# Patient Record
Sex: FEMALE | Race: WHITE | NOT HISPANIC OR LATINO | Employment: FULL TIME | URBAN - METROPOLITAN AREA
[De-identification: names, ages, dates, MRNs, and addresses within clinical notes are randomized per-mention and may not be internally consistent; named-entity substitution may affect disease eponyms.]

---

## 2017-09-05 ENCOUNTER — OFFICE VISIT (OUTPATIENT)
Dept: FAMILY MEDICINE | Facility: CLINIC | Age: 27
End: 2017-09-05
Payer: COMMERCIAL

## 2017-09-05 ENCOUNTER — LAB VISIT (OUTPATIENT)
Dept: LAB | Facility: HOSPITAL | Age: 27
End: 2017-09-05
Attending: INTERNAL MEDICINE
Payer: COMMERCIAL

## 2017-09-05 VITALS
TEMPERATURE: 98 F | HEIGHT: 65 IN | DIASTOLIC BLOOD PRESSURE: 68 MMHG | SYSTOLIC BLOOD PRESSURE: 110 MMHG | BODY MASS INDEX: 21.79 KG/M2 | WEIGHT: 130.75 LBS

## 2017-09-05 DIAGNOSIS — E73.9 DIETARY LACTOSE INTOLERANCE: ICD-10-CM

## 2017-09-05 DIAGNOSIS — F32.A DEPRESSION, UNSPECIFIED DEPRESSION TYPE: ICD-10-CM

## 2017-09-05 DIAGNOSIS — Z23 NEED FOR TDAP VACCINATION: ICD-10-CM

## 2017-09-05 DIAGNOSIS — M77.8 TENDONITIS OF FINGER: ICD-10-CM

## 2017-09-05 DIAGNOSIS — Z11.3 SCREENING FOR STD (SEXUALLY TRANSMITTED DISEASE): ICD-10-CM

## 2017-09-05 DIAGNOSIS — Z87.81 HISTORY OF TOE FRACTURE: ICD-10-CM

## 2017-09-05 DIAGNOSIS — L70.0 ACNE VULGARIS: ICD-10-CM

## 2017-09-05 DIAGNOSIS — F41.9 ANXIETY: ICD-10-CM

## 2017-09-05 DIAGNOSIS — Z00.00 ANNUAL PHYSICAL EXAM: Primary | ICD-10-CM

## 2017-09-05 PROCEDURE — 87591 N.GONORRHOEAE DNA AMP PROB: CPT

## 2017-09-05 PROCEDURE — 90715 TDAP VACCINE 7 YRS/> IM: CPT | Mod: S$GLB,,, | Performed by: INTERNAL MEDICINE

## 2017-09-05 PROCEDURE — 99999 PR PBB SHADOW E&M-NEW PATIENT-LVL III: CPT | Mod: PBBFAC,,, | Performed by: INTERNAL MEDICINE

## 2017-09-05 PROCEDURE — 86592 SYPHILIS TEST NON-TREP QUAL: CPT

## 2017-09-05 PROCEDURE — 86703 HIV-1/HIV-2 1 RESULT ANTBDY: CPT

## 2017-09-05 PROCEDURE — 90471 IMMUNIZATION ADMIN: CPT | Mod: S$GLB,,, | Performed by: INTERNAL MEDICINE

## 2017-09-05 PROCEDURE — 36415 COLL VENOUS BLD VENIPUNCTURE: CPT | Mod: PO

## 2017-09-05 PROCEDURE — 99385 PREV VISIT NEW AGE 18-39: CPT | Mod: 25,S$GLB,, | Performed by: INTERNAL MEDICINE

## 2017-09-05 RX ORDER — LORAZEPAM 0.5 MG/1
0.5 TABLET ORAL EVERY 6 HOURS PRN
COMMUNITY

## 2017-09-05 RX ORDER — TRETINOIN 1 MG/G
CREAM TOPICAL NIGHTLY
COMMUNITY
End: 2020-05-21

## 2017-09-05 RX ORDER — TRAZODONE HYDROCHLORIDE 100 MG/1
100 TABLET ORAL NIGHTLY PRN
COMMUNITY
End: 2020-05-21

## 2017-09-05 RX ORDER — DESVENLAFAXINE SUCCINATE 50 MG/1
100 TABLET, EXTENDED RELEASE ORAL DAILY
COMMUNITY
End: 2020-05-21

## 2017-09-05 RX ORDER — SPIRONOLACTONE 50 MG/1
50 TABLET, FILM COATED ORAL DAILY
COMMUNITY
End: 2020-05-21

## 2017-09-05 NOTE — PROGRESS NOTES
Subjective:        Patient ID: Chelsi Montejo is a 26 y.o. female.    Chief Complaint: Annual Exam ([facility])    HPI   Chelsi Montejo presents for annual exam.    1. ?lactose intolerance: Pt reports for the past few months she has had severe diarrhea, abdominal cramping after eating dairy products.  She notices it most after eating pizza.  She has tried taking Lactaid pills before she eats a lot of cheese or other dairy and thinks it helps.    2. R hand pain: Pt c/o pain or discomfort in the tendon of the right ring finger.  It comes and goes, feels like it's connected to and radiates into the elbow.  Nothing in particular makes it worse but she notices it most when she is typing or throwing the ball for her dog.  Ice helps the discomfort.  This does not limit her usual activity or work.  Pt is R handed and types a lot for work.    3. Hx of R great toe fracture: Pt broke her R great toe >1 year ago, never completed PT due to traveling frequently for work.  The pain continues to gradually improve but she reports her gait is not quite normal yet and she has developed a bunion as a result of the fx.    Review of Systems   Constitutional: Positive for fatigue. Negative for unexpected weight change.   HENT: Negative for ear pain, hearing loss, sore throat and trouble swallowing.    Eyes: Negative for visual disturbance.   Respiratory: Negative for chest tightness and shortness of breath.    Cardiovascular: Negative for chest pain and leg swelling.   Gastrointestinal: Negative for abdominal pain, blood in stool, constipation, diarrhea, nausea and vomiting.   Genitourinary: Negative for difficulty urinating, hematuria, menstrual problem and vaginal discharge.   Musculoskeletal: Positive for back pain.   Skin: Negative for rash.   Neurological: Negative for dizziness, light-headedness and headaches.   Psychiatric/Behavioral: Positive for dysphoric mood and sleep disturbance. The patient is nervous/anxious.             Objective:        Vitals:    09/05/17 1408   BP: 110/68   Temp: 97.8 °F (36.6 °C)     Physical Exam   Constitutional: She is oriented to person, place, and time. She appears well-developed and well-nourished. No distress.   HENT:   Head: Normocephalic and atraumatic.   Right Ear: External ear normal.   Left Ear: External ear normal.   Nose: Nose normal.   Mouth/Throat: Oropharynx is clear and moist. No oropharyngeal exudate.   - bilateral ear canals clear, tympanic membranes visualized - normal color and light reflex   Eyes: Conjunctivae and EOM are normal.   Neck: Normal range of motion. Neck supple. No thyromegaly present.   Cardiovascular: Normal rate, regular rhythm and normal heart sounds.    No murmur heard.  Pulmonary/Chest: Effort normal and breath sounds normal. No respiratory distress.   Abdominal: Soft. She exhibits no distension and no mass. There is no tenderness. There is no guarding.   Musculoskeletal: Normal range of motion. She exhibits tenderness (mild along 4th right finger tendon of extensor digitorum). She exhibits no edema or deformity.   Lymphadenopathy:     She has no cervical adenopathy.   Neurological: She is alert and oriented to person, place, and time.   Skin: Skin is warm and dry.   Vitals reviewed.          Assessment:         1. Annual physical exam    2. Screening for STD (sexually transmitted disease)    3. Need for Tdap vaccination    4. Tendonitis of finger    5. History of toe fracture    6. Dietary lactose intolerance    7. Anxiety    8. Acne vulgaris              Plan:         Chelsi was seen today for annual exam.    Diagnoses and all orders for this visit:    Annual physical exam  - STI screening, TDaP today    Screening for STD (sexually transmitted disease)  -     C. trachomatis/N. gonorrhoeae by AMP DNA Urine  -     HIV-1 and HIV-2 antibodies; Future  -     RPR; Future    Need for Tdap vaccination  -     Tdap Vaccine    Tendonitis of finger: Limit activities that  exacerbate pain, try switching to left hand for some activities, ice and NSAIDs for pain and inflammation, referral to PT  -     Ambulatory Referral to Physical/Occupational Therapy    History of toe fracture: Refer to PT for gait training  -     Ambulatory Referral to Physical/Occupational Therapy    Dietary lactose intolerance: Recommend discontinuing all dairy for 2 weeks the add back one food item at a time to see if pt can tolerate individual dairy products.  Okay to take Lactase enzymes before eating dairy.  Follow up if sx persist despite elimination of dairy products.    Anxiety: Prystiq 50mg qd, Ativan PRN, Trazodone PRN insomnia; follow up with psychiatrist Dr. Carmela Narayanan.    Acne vulgaris: Retin A, Spironolactone 25mg qd; follow up derm.  Pt reports lab work done with derm when Spironolactone started.          Return in 1  year for annual exam; sooner PRN.

## 2017-09-06 ENCOUNTER — TELEPHONE (OUTPATIENT)
Dept: FAMILY MEDICINE | Facility: CLINIC | Age: 27
End: 2017-09-06

## 2017-09-06 DIAGNOSIS — M77.8 TENDONITIS OF FINGER: Primary | ICD-10-CM

## 2017-09-06 LAB
C TRACH DNA SPEC QL NAA+PROBE: NOT DETECTED
HIV 1+2 AB+HIV1 P24 AG SERPL QL IA: NEGATIVE
N GONORRHOEA DNA SPEC QL NAA+PROBE: NOT DETECTED
RPR SER QL: NORMAL

## 2017-09-06 NOTE — TELEPHONE ENCOUNTER
Notified patient of negative results per . Patient states that PT needs a separate order for O.T. for the tendonitis.

## 2017-09-07 ENCOUNTER — TELEPHONE (OUTPATIENT)
Dept: FAMILY MEDICINE | Facility: CLINIC | Age: 27
End: 2017-09-07

## 2017-09-07 NOTE — TELEPHONE ENCOUNTER
Notified patient that O.T. Was ordered and if they need anything else to Please call or send message. Patient verbalizes understanding.

## 2017-09-11 ENCOUNTER — CLINICAL SUPPORT (OUTPATIENT)
Dept: REHABILITATION | Facility: HOSPITAL | Age: 27
End: 2017-09-11
Attending: INTERNAL MEDICINE
Payer: COMMERCIAL

## 2017-09-11 DIAGNOSIS — R27.8 COORDINATION IMPAIRMENT: ICD-10-CM

## 2017-09-11 DIAGNOSIS — M25.674 DECREASED RANGE OF MOTION OF RIGHT FOOT: ICD-10-CM

## 2017-09-11 DIAGNOSIS — R53.1 WEAKNESS: ICD-10-CM

## 2017-09-11 DIAGNOSIS — R26.89 IMPAIRMENT OF BALANCE: ICD-10-CM

## 2017-09-11 PROCEDURE — 97110 THERAPEUTIC EXERCISES: CPT | Mod: PO

## 2017-09-11 PROCEDURE — 97161 PT EVAL LOW COMPLEX 20 MIN: CPT | Mod: PO

## 2017-09-11 NOTE — PROGRESS NOTES
OUTPATIENT PHYSICAL THERAPY  PHYSICAL THERAPY EVALUATION    Name: Chelsi Montejo  Clinic Number: [id number]    Evaluation Date: 09/11/2017  Visit #: 1 / 10   Authorization period Expiration: 12/31/2017   Plan of Care Expiration: 12/31/2017  Precautions: N/A    Diagnosis:   Encounter Diagnoses   Name Primary?    Weakness     Impairment of balance     Decreased range of motion of right foot     Coordination impairment      Physician: Ida Rowe MD  Treatment Orders: PT Eval and Treat  Past Medical History:   Diagnosis Date    Acne vulgaris     derm    Anxiety 09/05/2017    psychiatry; Dr. Carmela Narayanan    Depression 09/05/2017    psychiatry; Dr. Carmela Narayanan    History of toe fracture 9/5/2017    Right great toe    Insomnia     psychiatry; Dr. Carmela Narayanan     Current Outpatient Prescriptions   Medication Sig    desvenlafaxine succinate (PRISTIQ) 50 MG Tb24 Take 100 mg by mouth once daily.    lorazepam (ATIVAN) 0.5 MG tablet Take 0.5 mg by mouth every 6 (six) hours as needed for Anxiety.    spironolactone (ALDACTONE) 50 MG tablet Take 50 mg by mouth once daily.    trazodone (DESYREL) 100 MG tablet Take 100 mg by mouth nightly as needed for Insomnia.    tretinoin (RETIN-A) 0.1 % cream Apply topically every evening.     No current facility-administered medications for this visit.      Review of patient's allergies indicates:  No Known Allergies    History   Prior Therapy: Physical therapy   Social History: Pt currently lives in apartment without stairs, drives, and works full time.   Previous functional status: Prior to injury pt was able to exercise regularly and experienced no limitations in mobility.   Current functional status: Pt is currently able to perform daily ADLs independently with mild reproduction of symptoms. However, pt is unable to participate in physical activity such as hiking and prolonged walking due to injury.   Work: Investigator, pt has a standing desk at work and is not  "currently limited in occupation duties.     Subjective   History of Present Illness: Patient arrived to [facility] with complaints of R foot pain originating in March 2016. Pt reported falling while walking two dogs pulling her in opposite directions. She injured her toe and began to self treat with ice and rest for 4 months before receiving medical treatment. Per pt report, she fractured her big toe and wore a boot for 3 months following the identification of fracture. Pt reported no prior history of injury to foot or ankle. However, patient stated she had part of her great right toe removed "years ago" by a podiatrist. Patient complained of pain worsening throughout the day and with excessive walking in poor shoes. Pt participated in previous trial of physical therapy after initial injury, but stated she was unable to complete therapy because of her work schedule. Additionally, pt reported previous history of hip pain that began ~ 8 years ago and is treated with exercises prescribed by her orthopedic physician.   DOI: March 2016   Imaging, none  Pain: current 2/10, worst 5/10, best 1/10, Aching and Dull, intermittent  Radicular symptoms: no  Aggravating factors: wearing bad shoes, flares up frequently, hiking, driving   Easing factors: massage, used ice, toe raises   Pts goals: Chelsi wishes to return to PLOF and relieve symptoms experienced during activity.     Objective   Mental status: alert, interactive, oriented x3  Posture/ Alignment: Fair   Chelsi demonstrated forward head, scapular protraction, and increased pronation to B feet. Pt also presented with medial bunion on R MTP that, per pt, began to develop after injury.    GAIT DEVIATIONS: Chelsi amb with decreased pelvic and trunk rotation and excessive ER of B LE.  During examination pt reported feeling "more stable" walking barefoot than in shoes.     ROM:     PROM Right Left Comment   DF: 5 degrees 12    PF: 55 degrees 50 degrees    Eversion: 25 " degrees 65 degrees    Inversion: 20 degrees 60 degrees    1st MTP Ext: 35 degrees 60 degrees    *pain      Strength:        Right Left Comment   DF: 4+/5 5/5    PF: 4+/5 5/5    Eversion: 5/5 5/5    Inversion: 5/5 5/5    1st MTP Ext: 4/5 5/5          Special Tests:    - Anterior Drawer: right negative    Neurovascular  Pt presented with intact sensation and normal reflexes to B Le.   Balance:   - Pt performed modified tandem stance with R LE posterior and moderate sway for ~ 20 seconds before LOB  - Pt performed SLS on R LE with moderate sway and symptoms for ~ 15 seconds before LOB    Palpation:  Pt tender to palpation over posterior tibialis insertion, R medial calf, and along 1st metatarsal. Pt experienced relief with stretch to R posterior tibialis. While assessing muscle tone and length of R gastrocnemius patient displayed involuntary plantarflexion of R foot. As the therapist lightly palpated the proximal, medial calf the patient's R foot would involuntarily twitch into plantarflexion, as if testing the achilles reflex. Pt reported no pain, but was unable to describe symptoms when twitching was reproduced.     Joint Play:  Increased, symmetrical mobility at B talocrural joints in AP and PA directions  Symmetrical 2:1 ratio in B subtalar joints   Decreased supination in R midfoot  Pt reported no symptoms during joint mobilizations.     Pt/family was provided educational information, including: role of PT, goals for PT, scheduling - pt verbalized understanding. Discussed insurance plan with pt.     TREATMENT   Time In: 3:07 PM  Time Out: 4:07    Discussed Plan of Care with patient: Yes    Chelsi received 10 minutes of therapeutic exercises including:    Fischers 2 x 10    Gastroc/Soleux stretching 3 x 30 seconds each       Written Home Exercises Provided: See Patient Instructions   Exercises were reviewed and Chelsi was able to demonstrate them prior to the end of the session. Chelsi demonstrated good  understanding  of the education provided.     Assessment   Chelsi is a 26 y.o. female referred to outpatient physical therapy with a medical diagnosis of history of toe fracture. Chelsi presents with limitations in active mobility and intrinsic neuromuscular control of R foot. She demonstrates impairments including limitations as described in the problem list. Pt prognosis is Good. Positive prognostic factors include age, current health, level of activity, willingness to participate in therapy. Negative prognostic factors include time elapsed since initial injury. Pt will benefit from skilled outpatient physical therapy to address the above stated deficits, provide pt education, and to maximize pt's level of independence.     History  Co-morbidities and personal factors that may impact the plan of care Examination  Body Structures and Functions, activity limitations and participation restrictions that may impact the plan of care    Clinical Presentation   Co-morbidities:   none        Personal Factors:   lifestyle Body Regions:   lower extremities    Body Systems:   ROM  strength  balance  gait  motor control        Participation Restrictions:   Pt unable to participate in physical activity, prolonged driving, or walking due to symptoms.      Activity limitations:   Learning and applying knowledge  no deficits    General Tasks and Commands  no deficits    Communication  no deficits    Mobility  walking  driving (bike, car, motorcycle)    Self care  no deficits    Domestic Life  no deficits    Interactions/Relationships  no deficits    Life Areas  no deficits    Community and Social Life  no deficits         stable and uncomplicated                      low   moderate  high Decision Making/ Complexity Score:  low     Pt's spiritual, cultural and educational needs considered and pt agreeable to plan of care and goals as stated below:     Anticipated Barriers for physical therapy: Patient's work schedule which requires frequent  travel    GOALS: Short Term Goals:  4 weeks  1. Report decreased R ankle pain  <  5  /10 at worst to increase tolerance for prolonged standing.  2. Pt will be able to perform 2 x 10 4 inch step ups without increase in symptoms to improve stair ambulation.   3. Increased MMT for R ankle dorsiflexion to 5/5 to improve gait mechanics and increase stability while ambulating.  4. Pt to report a 50% improvement in ability to walk long distances to improve functional mobility.      Long Term Goals: 8 weeks  1. Report decreased R ankle pain  < 3 /10 at worst to increase tolerance for prolonged standing.  2. Pt will be able to perform 2 x 10 4 inch step downs without increase in symptoms and good eccentric control to improve stair ambulation.   3. Increase SLS on R to 45 seconds to improve stability when walking on uneven surfaces.  4. Pt to report a 50% improvement in ability to walk long distances to improve functional mobility.  5. Pt to be Independent with HEP to improve ROM and independence with ADL's      Plan   Outpatient physical therapy 1- 2 times weekly to include: pt ed, HEP, therapeutic exercises, therapeutic activities, neuromuscular re-education/ balance exercises, manual therapy, and modalities prn. Cont PT for 3 weeks. Pt may be seen by PTA as part of the rehabilitation team.     I certify the need for these services furnished under this plan of treatment and while under my care.    Veronica Franklin, PT

## 2017-09-12 PROBLEM — R26.89 IMPAIRMENT OF BALANCE: Status: ACTIVE | Noted: 2017-09-12

## 2017-09-12 PROBLEM — R53.1 WEAKNESS: Status: ACTIVE | Noted: 2017-09-12

## 2017-09-12 PROBLEM — R27.8 COORDINATION IMPAIRMENT: Status: ACTIVE | Noted: 2017-09-12

## 2017-09-12 PROBLEM — M25.674 DECREASED RANGE OF MOTION OF RIGHT FOOT: Status: ACTIVE | Noted: 2017-09-12

## 2017-09-15 ENCOUNTER — CLINICAL SUPPORT (OUTPATIENT)
Dept: REHABILITATION | Facility: HOSPITAL | Age: 27
End: 2017-09-15
Attending: INTERNAL MEDICINE
Payer: COMMERCIAL

## 2017-09-15 DIAGNOSIS — R53.1 WEAKNESS: ICD-10-CM

## 2017-09-15 DIAGNOSIS — R27.8 COORDINATION IMPAIRMENT: ICD-10-CM

## 2017-09-15 DIAGNOSIS — R26.89 IMPAIRMENT OF BALANCE: ICD-10-CM

## 2017-09-15 DIAGNOSIS — M25.674 DECREASED RANGE OF MOTION OF RIGHT FOOT: ICD-10-CM

## 2017-09-15 PROCEDURE — 97110 THERAPEUTIC EXERCISES: CPT | Mod: PO

## 2017-09-15 PROCEDURE — 97140 MANUAL THERAPY 1/> REGIONS: CPT | Mod: PO

## 2017-09-15 NOTE — PROGRESS NOTES
Physical Therapy Daily Note   Name: Chelsi Montejo  Clinic Number: [id number]    Time in: 9:00 AM  Time Out: 9:50 AM  Total Treatment Time: 50  Minutes  Last PN: NA  Date of eval: 9/11/2017  Visit #: 2/10  Auth expiration: 12/31/2017  POC expiration: 12/31/2017    Diagnosis:   Encounter Diagnoses   Name Primary?    Weakness     Impairment of balance     Decreased range of motion of right foot     Coordination impairment      Physician: Ida Rowe MD  Treatment Orders: PT Eval and Treat    Subjective   Pt reports: Compliance with HEP and no symptom provocation with exercises. Pt reports noticeable increase in symptoms while driving since evaluation.     Pain Scale:  3/10      Objective     Chelsi received therapeutic exercises to develop strength, neuromuscular control and endurance for 35 minutes including:     Short foot x 1 min   Toe Curls x 10  Resisted PF/INV, DF, and PF with YTB x 15 each   Toe Spreads x 1 min   Manual Resistance Exercises:  - PF/INV to DF/EV  X 15  - PF/EV to DF/INV x 15  - R MTP extension to flexion x 15  Calf Stretch 3 x 30 seconds  Soleus Stretch 3 x 30 seconds     Chelsi received the following manual therapy techniques: Joint mobilizations and Soft tissue Mobilization were applied to the: R foot for 15 minutes.     R MTP PA and AP Grade I - IV mobilizations     R navicular on MTP Grade I - III mobilizations     Posterior Tib Stretch 3 x 30 seconds each     STM to deep instrinsic tissue between 1st and 2nd Metatarsals       Pt educated on TherEx instructions and purpose, role of foot intrinsics, posterior tibialis, windlass mechanism, and arch support. Pt demo good understanding of the education provided. Chelsi demonstrated good return demonstration of activities.       Written Home Exercises Provided: Patient educated to continue with previously issued HEP.  Exercises were reviewed and Chelsi was able to demonstrate them prior to  the end of the session.  Chelsi demonstrated good  understanding of the education provided.     Assessment   Chelsi demonstrated impaired neuromuscular control of posterior tibialis, FHL and deep intrinsics of R foot. Pt required tactile cueing for increased activation of instrinsic muscles during short foot exercise and decreased activation of toe extensors. Pt reported pain with toe spread exercise in R great toe. Pt demonstrated decreased activation of FHL during MRE involving R toe extension and required manual cueing to great toe. Chelsi denied increase in symptoms throughout therapy treatment and experienced no adverse effects to exercise.     Chelsi is progressing well towards her goals. Will benefit from con't skilled care.    Anticipated barriers to physical therapy: none    Pt's spiritual, cultural and educational needs considered and pt agreeable to plan of care and goals.    Plan   Continue with established Plan of Care towards PT goals. Introduce balance activities next treatment session.       Veronica Franklin, PT

## 2017-09-19 ENCOUNTER — CLINICAL SUPPORT (OUTPATIENT)
Dept: REHABILITATION | Facility: HOSPITAL | Age: 27
End: 2017-09-19
Attending: INTERNAL MEDICINE
Payer: COMMERCIAL

## 2017-09-19 DIAGNOSIS — R26.89 IMPAIRMENT OF BALANCE: ICD-10-CM

## 2017-09-19 DIAGNOSIS — R53.1 WEAKNESS: ICD-10-CM

## 2017-09-19 DIAGNOSIS — M25.674 DECREASED RANGE OF MOTION OF RIGHT FOOT: ICD-10-CM

## 2017-09-19 DIAGNOSIS — R27.8 COORDINATION IMPAIRMENT: ICD-10-CM

## 2017-09-19 PROCEDURE — 97110 THERAPEUTIC EXERCISES: CPT | Mod: PO

## 2017-09-19 PROCEDURE — 97140 MANUAL THERAPY 1/> REGIONS: CPT | Mod: PO

## 2017-09-19 NOTE — PROGRESS NOTES
Physical Therapy Daily Note   Name: Chelsi Montejo  Clinic Number: [id number]    Time in: 9:00 AM  Time Out: 10:00 AM  Total Treatment Time: 60 minutes  Last PN: NA  Date of eval: 2017  Visit #: 3/10  Auth expiration: 2017  POC expiration: 2017    Diagnosis:   Encounter Diagnoses   Name Primary?    Weakness     Impairment of balance     Decreased range of motion of right foot     Coordination impairment      Physician: Ida Rowe MD  Treatment Orders: PT Eval and Treat    Subjective   Pt reports: Feeling okay today, drove a lot yesterday with minimal symptoms during and after.      Pain Scale:  3/10      Objective     Chelsi received therapeutic exercises to develop strength, neuromuscular control and endurance for 35 minutes includin. Short foot 2 x 1 min   2. Toe Curls with full extension 2 x 1 min  3. Resisted PF/DF with YTB x 15 each   4. Toe Spreads x 1 min   5. Manual Resistance Exercises:   - PF/INV to DF/EV  X 15   - PF/EV to DF/INV x 15   - R MTP extension to flexion x 15  6. Calf Stretch 3 x 30 seconds  7. Soleus Stretch 3 x 30 seconds     Chelsi received the following manual therapy techniques: Joint mobilizations and Soft tissue Mobilization were applied to the: R foot for 15 minutes.     R MTP PA and AP Grade I - IV mobilizations     Posterior Tib Stretch 3 x 30 seconds each     FHL stretch 3 x 30 seconds     STM to deep instrinsic tissue between 1st and 2nd Metatarsals     Patient participated in neuromuscular re-education activities to improve: Balance for 10 minutes. The following activities were included:  1. R and L SLS on ground 2 x 1 min   2. SL Son foam 2 x 1 min   3. Controlled DF/PF on Fitter Board 2 x 1 min   4. Fitter Balnace x 1 min     Pt educated on new therapeutic exercises, proprioception and ankle stability. Pt demo good understanding of the education provided. Chelsi demonstrated good return  demonstration of activities.       Written Home Exercises Provided: Patient educated to continue with previously issued HEP.  Exercises were reviewed and Chelsi was able to demonstrate them prior to the end of the session.  Chelsi demonstrated good  understanding of the education provided.     Assessment   Chelsi participated in treatment session without any symptom provocation or adverse effects. Pt demonstrated difficulty completing balance activities on fitter board in A/P and M/L directions. Pt unable to maintain balance on board from > 15 seconds before UE contact for support. Chelsi continues to display poor neuromuscular control of EHL and great toe extension without visual, tactile and verbal cueing. Pt demonstrated improved control of intrinsic musculature during short foot and toe spreading exercises.     Chelsi is progressing well towards her goals. Will benefit from con't skilled care.    Anticipated barriers to physical therapy: none    Pt's spiritual, cultural and educational needs considered and pt agreeable to plan of care and goals.    Plan   Continue with established Plan of Care towards PT goals. Introduce balance activities next treatment session.       Veronica Franklin, PT

## 2017-09-25 ENCOUNTER — CLINICAL SUPPORT (OUTPATIENT)
Dept: REHABILITATION | Facility: HOSPITAL | Age: 27
End: 2017-09-25
Attending: INTERNAL MEDICINE
Payer: COMMERCIAL

## 2017-09-25 DIAGNOSIS — M25.674 DECREASED RANGE OF MOTION OF RIGHT FOOT: ICD-10-CM

## 2017-09-25 DIAGNOSIS — R27.8 COORDINATION IMPAIRMENT: ICD-10-CM

## 2017-09-25 DIAGNOSIS — R53.1 WEAKNESS: ICD-10-CM

## 2017-09-25 DIAGNOSIS — R26.89 IMPAIRMENT OF BALANCE: ICD-10-CM

## 2017-09-25 PROCEDURE — 97140 MANUAL THERAPY 1/> REGIONS: CPT | Mod: PO

## 2017-09-25 PROCEDURE — 97110 THERAPEUTIC EXERCISES: CPT | Mod: PO

## 2017-09-25 NOTE — PROGRESS NOTES
Physical Therapy Daily Note   Name: Chelsi Montejo  Clinic Number: [id number]    Time in: 9:01 AM  Time Out: 9:56 AM  Total Treatment Time: 55 minutes  Last PN: NA  Date of eval: 2017  Visit #: 4/10  Auth expiration: 2017  POC expiration: 2017    Diagnosis:   Encounter Diagnoses   Name Primary?    Weakness     Impairment of balance     Decreased range of motion of right foot     Coordination impairment      Physician: Ida Rowe MD  Treatment Orders: PT Eval and Treat    Subjective   Pt reports: She is feeling good today, reports usual soreness. Pt drove to [place] this weekend with no increase in symptoms and went hiking without pain. Pt stated she felt more mobility in her toe while hiking up and downhill.     Pain Scale:  2/10      Objective     Chelsi received therapeutic exercises to develop strength, neuromuscular control and endurance for 40 minutes includin. Short foot in Standing 2 x 1 min   2. Toe Curls with full extension 2 x 1 min  3. Resisted PF/DF with YTB x 15 each (NP,added to HEP )  4. Toe Spreads x 1 min   5. Manual Resistance Exercises (NP):   - PF/INV to DF/EV  X 15   - PF/EV to DF/INV x 15   - R MTP extension to flexion x 15  6. Calf Stretch 3 x 30 seconds  7. Soleus Stretch 3 x 30 seconds   8. Standing B Heel Raises with R eccentric lowering 2 x 10   9. Baps board CW/CCW x 1 min each   10. Heel/Toe Walking  3 laps x 5' each     Chelsi received the following manual therapy techniques: Joint mobilizations and Soft tissue Mobilization were applied to the: R foot for 15 minutes.     R MTP PA and AP Grade II - IV mobilizations     Posterior Tib Stretch 3 x 30 seconds each     FHL stretch 3 x 30 seconds     STM to deep instrinsic tissue between 1st and 2nd Metatarsals     STM to Achilles tendon     Patient participated in neuromuscular re-education activities to improve: Balance for 5 minutes. The following  activities were included:  1. Controlled DF/PF on Fitter Board 2 x 1 min with fingertip contact prn   2. Fitter Balnace AP and ML x 1 min each     Pt educated on new therapeutic exercises and new HEP. Pt demo good understanding of the education provided. Chelsi demonstrated good return demonstration of activities.     Written Home Exercises Provided: Yes, New exercises added (ankle 4 ways with YTB, Heel slides with heel raise, short foot in sitting)  Exercises were reviewed and Chelsi was able to demonstrate them prior to the end of the session.  Chelsi demonstrated good  understanding of the education provided.     Assessment   Chelsi participated in treatment session without any symptom provocation or adverse effects. Pt required manual cueing to maintain proximal MTP contact on ground during standing short foot exercise. Chelsi demonstrated increased intrinsic activation during toe curls and short foot in treatment session. Additionally, Chelsi displayed improved control and activation of EHL. Tactile and verbal cueing given during standing heel raises to avoid excessive supination of forefoot while up on toes.Chelsi is progressing well towards her goals. Will benefit from con't skilled care.    Anticipated barriers to physical therapy: none    Pt's spiritual, cultural and educational needs considered and pt agreeable to plan of care and goals.    Plan   Continue with established Plan of Care towards PT goals.       Veronica Franklin, PT

## 2017-09-29 ENCOUNTER — CLINICAL SUPPORT (OUTPATIENT)
Dept: REHABILITATION | Facility: HOSPITAL | Age: 27
End: 2017-09-29
Attending: INTERNAL MEDICINE
Payer: COMMERCIAL

## 2017-09-29 DIAGNOSIS — R26.89 IMPAIRMENT OF BALANCE: ICD-10-CM

## 2017-09-29 DIAGNOSIS — R53.1 WEAKNESS: ICD-10-CM

## 2017-09-29 DIAGNOSIS — R27.8 COORDINATION IMPAIRMENT: ICD-10-CM

## 2017-09-29 DIAGNOSIS — M25.674 DECREASED RANGE OF MOTION OF RIGHT FOOT: ICD-10-CM

## 2017-09-29 PROCEDURE — 97110 THERAPEUTIC EXERCISES: CPT | Mod: PO

## 2017-09-29 PROCEDURE — 97140 MANUAL THERAPY 1/> REGIONS: CPT | Mod: PO

## 2017-09-29 NOTE — PROGRESS NOTES
Physical Therapy Daily Note   Name: Chelsi Montejo  Clinic Number: [id number]    Time in: 9:01 AM  Time Out: 9:51 AM  Total Treatment Time: 50 minutes  Last PN: NA  Date of eval: 2017  Visit #: 5/10  Auth expiration: 2017  POC expiration: 2017    Diagnosis:   Encounter Diagnoses   Name Primary?    Weakness     Impairment of balance     Decreased range of motion of right foot     Coordination impairment      Physician: Ida Rowe MD  Treatment Orders: PT Eval and Treat    Subjective   Pt reports: Pt reports increased soreness today, stated she drove for hours earlier this week and her foot felt sore after.     Pain Scale:  3/10      Objective     Chelsi received therapeutic exercises to develop strength, neuromuscular control and endurance for 35 minutes includin. Short foot in Standing 2 x 1 min   2. Toe Curls with full extension 2 x 1 min  3. Resisted PF/DF with YTB x 15 each (NP,added to HEP )  4. Toe Spreads x 1 min   5. Manual Resistance Exercises:   - R MTP extension to flexion x 15  6. Calf Stretch 3 x 30 seconds (NP)  7. Soleus Stretch 3 x 30 seconds (NP)  8. Standing B Heel Raises with R eccentric lowering 2 x 10   9. Baps board CW/CCW  2 x 1 min each   10. Heel/Toe Walking  3 laps x 5' each     Chelsi received the following manual therapy techniques: Joint mobilizations and Soft tissue Mobilization were applied to the: R foot for 10 minutes.     R MTP PA and AP Grade II - IV mobilizations     Posterior Tib Stretch 3 x 30 seconds each     M/L talus glides Gr I -III     STM to deep instrinsic tissue between 1st and 2nd Metatarsals     STM to Achilles tendon (NP)    FHL stretch 3 x 30 seconds (NP)     Patient participated in neuromuscular re-education activities to improve: Balance for 5 minutes. The following activities were included:  1. Controlled DF/PF on Fitter Board 2 x 1 min with fingertip contact prn   2. Fitter  Balnace AP and ML x 1 min each   3. SLS on blue foam 2 x 30 seconds     Pt educated on manual therapy techniques performed. Pt demo good understanding of the education provided. Chelsi demonstrated good return demonstration of activities.     Written Home Exercises Provided: No, pt instructed to continue with current HEP   Exercises were reviewed and Chelsi was able to demonstrate them prior to the end of the session.  Chelsi demonstrated good  understanding of the education provided.     Assessment   Chelsi participated in treatment session without any symptom provocation or adverse effects. Pt demonstrated improve gastroc control during eccentric lowering portion of calf raises. Additionally, pt was able to limit excessive supination while performing upward portion of calf raises. Pt demonstrated hypomobility and tenderness during medial talus glides. Pt also displayed tenderness over R medial bunion. Pt continues to demonstrate poor neuromuscular control of great toe extension.  Chelsi is progressing well towards her goals. Will benefit from con't skilled care.    Anticipated barriers to physical therapy: none    Pt's spiritual, cultural and educational needs considered and pt agreeable to plan of care and goals.    Plan   Continue with established Plan of Care towards PT goals.       Veronica Franklin, PT

## 2017-10-06 ENCOUNTER — CLINICAL SUPPORT (OUTPATIENT)
Dept: REHABILITATION | Facility: HOSPITAL | Age: 27
End: 2017-10-06
Attending: INTERNAL MEDICINE
Payer: COMMERCIAL

## 2017-10-06 DIAGNOSIS — M25.674 DECREASED RANGE OF MOTION OF RIGHT FOOT: ICD-10-CM

## 2017-10-06 DIAGNOSIS — R53.1 WEAKNESS: ICD-10-CM

## 2017-10-06 DIAGNOSIS — R26.89 IMPAIRMENT OF BALANCE: ICD-10-CM

## 2017-10-06 DIAGNOSIS — R27.8 COORDINATION IMPAIRMENT: ICD-10-CM

## 2017-10-06 PROCEDURE — 97140 MANUAL THERAPY 1/> REGIONS: CPT | Mod: PO

## 2017-10-06 PROCEDURE — 97110 THERAPEUTIC EXERCISES: CPT | Mod: PO

## 2017-10-06 NOTE — PROGRESS NOTES
Physical Therapy Daily Note   Name: Chelsi Montejo  Clinic Number: [id number]    Time in: 2:00 PM  Time Out: 3:05 AM  Total Treatment Time: 65 minutes  Last PN: NA  Date of eval: 9/11/2017  Visit #: 6/10  Auth expiration: 12/31/2017  POC expiration: 12/31/2017    Diagnosis:   Encounter Diagnoses   Name Primary?    Weakness     Impairment of balance     Decreased range of motion of right foot     Coordination impairment      Physician: Ida Rowe MD  Treatment Orders: PT Eval and Treat    Subjective   Pt reports: She is feeling more sore today than usual. Reports she hasn't been compliant with HEP this week. Pt states he can not think of any activities that would exacerbate her pain.     Pain Scale:  1/10, pt describes pain as mostly soreness       Objective     Chelsi received therapeutic exercises to develop strength, neuromuscular control and endurance for 35 minutes including:     Short foot in Standing 2 x 1 min   Toe Curls with full extension 2 x 1 min  Toe Spreads x 1 min   Manual Resistance Exercises:   - R MTP extension to flexion x 15  Calf Stretch 3 x 30 seconds (NP)  Soleus Stretch 3 x 30 seconds (NP)  Standing B Heel Raises with R eccentric lowering 2 x 10   Single Leg Heel Raise on R x 10  Baps board CW/CCW  2 x 1 min each in standing   Heel/Toe Walking  3 laps x 5' each   4 way theraband YTB x 15 each   Great Toe extension in standing x 20    (next vist: all, gait training)    Chelsi received the following manual therapy techniques: Joint mobilizations and Soft tissue Mobilization were applied to the: R foot for 25 minutes.     R MTP PA and AP Grade II - IV mobilizations     Posterior Tib Stretch 3 x 30 seconds each (NP)    M/L talus glides Gr I -III     STM to deep instrinsic tissue between Metatarsals     STM to Achilles tendon (NP)    FHL stretch 3 x 30 seconds (NP)     Luekotape applied over hippafix tape across metatarsal heads to  increase stability and approximation of metatarsals. Pt denied any adhesive or latex allergy. Pt instructed to remove tape if signs of redness, itching or irritation.     Patient participated in neuromuscular re-education activities to improve: Balance for 2 minutes. The following activities were included:  Controlled DF/PF on Fitter Board 2 x 1 min with fingertip contact prn (NP)  Fitter Balnace AP and ML x 1 min each (NP)  SLS on blue foam 2 x 30 seconds     Ice pack applied to R foot for 5 minutes following treatment. Pt skin intact upon removal.     Pt educated on manual therapy techniques performed, taping, progression of therex. Pt demo good understanding of the education provided. Chelsi demonstrated good return demonstration of activities.     Written Home Exercises Provided: No, pt instructed to continue with current HEP   Exercises were reviewed and Chelsi was able to demonstrate them prior to the end of the session.  Chelsi demonstrated good  understanding of the education provided.     Assessment   Pt demonstrated increased symptoms this treatment session. Pt reported tenderness across and between met heads, specifically 2 - 4. Pt felt relief with STM and PA mobilizations of metatarsals. Pt unable to maintain height on R LE during toe walking and was unable to clear R LE. Pt progressed to BAPS board in standing and demonstrated good control of intrinsic musculature. Pt displayed difficulty performing isolated great toe extension in standing without medial deviation or 2-4 toe extension. Pt unable to perform second trial of SLS due to pain in R foot. Pt completed treatment with minimal adverse effects.   Chelsi is progressing well towards her goals. Will benefit from con't skilled care.    Anticipated barriers to physical therapy: none    Pt's spiritual, cultural and educational needs considered and pt agreeable to plan of care and goals.    Plan   Continue with established Plan of Care towards PT goals.        Veronica Franklin, PT

## 2017-10-09 ENCOUNTER — DOCUMENTATION ONLY (OUTPATIENT)
Dept: REHABILITATION | Facility: HOSPITAL | Age: 27
End: 2017-10-09

## 2017-10-09 ENCOUNTER — TELEPHONE (OUTPATIENT)
Dept: REHABILITATION | Facility: HOSPITAL | Age: 27
End: 2017-10-09

## 2017-10-09 NOTE — PROGRESS NOTES
Pt not present for visit scheduled at 9:00 am on Monday, October 9th. This is pt's first no show appointment. PT lvm regarding missed visit and left contact info to schedule future appointments.

## 2017-10-25 ENCOUNTER — DOCUMENTATION ONLY (OUTPATIENT)
Dept: REHABILITATION | Facility: HOSPITAL | Age: 27
End: 2017-10-25

## 2017-10-25 NOTE — PROGRESS NOTES
Pt not present for visit scheduled at 3:00 PM on Wednesday, October 25th. This is the patient's second no show visit.

## 2017-12-05 ENCOUNTER — DOCUMENTATION ONLY (OUTPATIENT)
Dept: REHABILITATION | Facility: HOSPITAL | Age: 27
End: 2017-12-05

## 2017-12-05 NOTE — PROGRESS NOTES
PHYSICAL THERAPY DISCHARGE SUMMARY     Name: Chelsi Montejo  Clinic Number: [id number]      Diagnosis:        Encounter Diagnoses   Name Primary?    Weakness      Impairment of balance      Decreased range of motion of right foot      Coordination impairment        Physician: Ilia SZYMANSKI MD   Treatment Orders: PT Eval and Treat  Past Medical History:   Diagnosis Date    Acne vulgaris     derm    Anxiety 09/05/2017    psychiatry; Dr. Carmela Narayanan    Depression 09/05/2017    psychiatry; Dr. Carmela Narayanan    History of toe fracture 9/5/2017    Right great toe    Insomnia     psychiatry; Dr. Carmela Narayanan       Initial visit: 09/11/2017  Date of Last visit: 10/06/2017  Date of Discharge Note:  12/5/2017  Total Visits Received: 6  Missed Visits: 2  ASSESSMENT     GOALS: Short Term Goals:  4 weeks  1. Report decreased R ankle pain  <  5  /10 at worst to increase tolerance for prolonged standing.  2. Pt will be able to perform 2 x 10 4 inch step ups without increase in symptoms to improve stair ambulation.   3. Increased MMT for R ankle dorsiflexion to 5/5 to improve gait mechanics and increase stability while ambulating.  4. Pt to report a 50% improvement in ability to walk long distances to improve functional mobility.        Long Term Goals: 8 weeks  1. Report decreased R ankle pain  < 3 /10 at worst to increase tolerance for prolonged standing.  2. Pt will be able to perform 2 x 10 4 inch step downs without increase in symptoms and good eccentric control to improve stair ambulation.   3. Increase SLS on R to 45 seconds to improve stability when walking on uneven surfaces.  4. Pt to report a 50% improvement in ability to walk long distances to improve functional mobility.  5. Pt to be Independent with HEP to improve ROM and independence with ADL's      Status Towards Goals Met:  Pt did not meet any planned therapy goals and did not return to therapy     Goals Not achieved and why: Pt has not  scheduled any additional visits and has not returned to therapy.     Discharge reason : Patient self discharge      PLAN   This patient is discharged from Physical Therapy Services.       Veronica Franklin, PT

## 2018-12-19 ENCOUNTER — OFFICE VISIT (OUTPATIENT)
Dept: URGENT CARE | Facility: CLINIC | Age: 28
End: 2018-12-19
Payer: COMMERCIAL

## 2018-12-19 VITALS
SYSTOLIC BLOOD PRESSURE: 125 MMHG | RESPIRATION RATE: 18 BRPM | BODY MASS INDEX: 22.2 KG/M2 | TEMPERATURE: 98 F | WEIGHT: 130 LBS | DIASTOLIC BLOOD PRESSURE: 80 MMHG | OXYGEN SATURATION: 100 % | HEART RATE: 62 BPM | HEIGHT: 64 IN

## 2018-12-19 DIAGNOSIS — H92.02 OTALGIA, LEFT: Primary | ICD-10-CM

## 2018-12-19 PROCEDURE — 99213 OFFICE O/P EST LOW 20 MIN: CPT | Mod: S$GLB,,, | Performed by: NURSE PRACTITIONER

## 2018-12-19 PROCEDURE — 3008F BODY MASS INDEX DOCD: CPT | Mod: CPTII,S$GLB,, | Performed by: NURSE PRACTITIONER

## 2018-12-19 RX ORDER — DIVALPROEX SODIUM 125 MG/1
125 CAPSULE, COATED PELLETS ORAL 2 TIMES DAILY
COMMUNITY
End: 2020-05-21

## 2018-12-19 NOTE — PATIENT INSTRUCTIONS
Use a decongestant or afrin prior to flying (dont use afrin more than 3 days in a row)  rec to start over the counter antihistamine like zyrtec or claritin as well as nasal steroid such as flonase or nasacort to decrease inflammation       Earache, No Infection (Adult)  Earaches can happen without an infection. This occurs when air and fluid build up behind the eardrum causing a feeling of fullness and discomfort and reduced hearing. This is called otitis media with effusion (OME) or serous otitis media. It means there is fluid in the middle ear. It is not the same as acute otitis media, which is typically from infection.  OME can happen when you have a cold if congestion blocks the passage that drains the middle ear. This passage is called the eustachian tube. OME may also occur with nasal allergies or after a bacterial middle ear infection.    The pain or discomfort may come and go. You may hear clicking or popping sounds when you chew or swallow. You may feel that your balance is off. Or you may hear ringing in the ear.  It often takes from several weeks up to 3 months for the fluid to clear on its own. Oral pain relievers and ear drops help if there is pain. Decongestants and antihistamines sometimes help. Antibiotics don't help since there is no infection. Your doctor may prescribe a nasal spray to help reduce swelling in the nose and eustachian tube. This can allow the ear to drain.  If your OME doesn't improve after 3 months, surgery may be used to drain the fluid and insert a small tube in the eardrum to allow continued drainage.  Because the middle ear fluid can become infected, it is important to watch for signs of an ear infection which may develop later. These signs include increased ear pain, fever, or drainage from the ear.  Home care  The following guidelines will help you care for yourself at home:  · You may use over-the-counter medicine as directed to control pain, unless another medicine was  prescribed. If you have chronic liver or kidney disease or ever had a stomach ulcer or GI bleeding, talk with your doctor before using these medicines. Aspirin should never be used in anyone under 18 years of age who is ill with a fever. It may cause severe liver damage.  · You may use over-the-counter decongestants such as phenylephrine or pseudoephedrine. But they are not always helpful. Don't use nasal spray decongestants more than 3 days. Longer use can make congestion worse. Prescription nasal sprays from your doctor don't typically have those restrictions.  · Antihistamines may help if you are also having allergy symptoms.  · You may use medicines such as guaifenesin to thin mucus and promote drainage.  Follow-up care  Follow up with your healthcare provider or as advised if you are not feeling better after 3 days.  When to seek medical advice  Call your healthcare provider right away if any of the following occur:  · Your ear pain gets worse or does not start to improve   · Fever of 100.4°F (38°C) or higher, or as directed by your healthcare provider  · Fluid or blood draining from the ear  · Headache or sinus pain  · Stiff neck  · Unusual drowsiness or confusion  Date Last Reviewed: 10/1/2016  © 1511-0895 [employer]. [address] [place] [address], [place] [address]. All rights reserved. This information is not intended as a substitute for professional medical care. Always follow your healthcare professional's instructions.

## 2018-12-19 NOTE — PROGRESS NOTES
"Subjective:       Patient ID: Chelsi Montejo is a 28 y.o. female.    Vitals:  height is 5' 4" (1.626 m) and weight is 59 kg (130 lb). Her temperature is 97.9 °F (36.6 °C). Her blood pressure is 125/80 and her pulse is 62. Her respiration is 18 and oxygen saturation is 100%.     Chief Complaint: Otalgia    Patient states that her ears started to hurt after listening to loud music       Otalgia    There is pain in both ears. This is a new problem. The current episode started yesterday. The problem occurs constantly. The problem has been waxing and waning. There has been no fever. Pertinent negatives include no coughing, rash, sore throat or vomiting. She has tried NSAIDs for the symptoms. The treatment provided mild relief.       Constitution: Negative for chills, sweating, fatigue and fever.   HENT: Positive for ear pain. Negative for congestion, sinus pain, sinus pressure, sore throat and voice change.    Neck: Negative for painful lymph nodes.   Eyes: Negative for eye redness.   Respiratory: Negative for chest tightness, cough, sputum production, bloody sputum, COPD, shortness of breath, stridor, wheezing and asthma.    Gastrointestinal: Negative for nausea and vomiting.   Musculoskeletal: Negative for muscle ache.   Skin: Negative for rash.   Allergic/Immunologic: Negative for seasonal allergies and asthma.   Hematologic/Lymphatic: Negative for swollen lymph nodes.       Objective:      Physical Exam   Constitutional: She is oriented to person, place, and time. She appears well-developed and well-nourished. She is cooperative.  Non-toxic appearance. She does not appear ill. No distress.   HENT:   Head: Normocephalic and atraumatic.   Right Ear: Hearing, external ear and ear canal normal. Tympanic membrane is not erythematous and not bulging. A middle ear effusion is present.   Left Ear: Hearing, external ear and ear canal normal. Tympanic membrane is not erythematous and not bulging. A middle ear effusion is " present.   Nose: Nose normal. No mucosal edema, rhinorrhea or nasal deformity. No epistaxis. Right sinus exhibits no maxillary sinus tenderness and no frontal sinus tenderness. Left sinus exhibits no maxillary sinus tenderness and no frontal sinus tenderness.   Mouth/Throat: Uvula is midline, oropharynx is clear and moist and mucous membranes are normal. No trismus in the jaw. Normal dentition. No uvula swelling. No posterior oropharyngeal erythema.   Eyes: Conjunctivae and lids are normal. Right eye exhibits no discharge. Left eye exhibits no discharge. No scleral icterus.   Sclera clear bilat   Neck: Trachea normal, normal range of motion, full passive range of motion without pain and phonation normal. Neck supple.   Cardiovascular: Normal rate, regular rhythm, normal heart sounds, intact distal pulses and normal pulses.   Pulmonary/Chest: Effort normal and breath sounds normal. No respiratory distress.   Abdominal: Normal appearance. She exhibits no pulsatile midline mass.   Musculoskeletal: Normal range of motion. She exhibits no edema or deformity.   Neurological: She is alert and oriented to person, place, and time. She exhibits normal muscle tone. Coordination normal.   Skin: Skin is warm, dry and intact. She is not diaphoretic. No pallor.   Psychiatric: She has a normal mood and affect. Her speech is normal and behavior is normal. Judgment and thought content normal. Cognition and memory are normal.   Nursing note and vitals reviewed.      Assessment:       1. Otalgia, left        Plan:         Otalgia, left      Patient Instructions   Use a decongestant or afrin prior to flying (dont use afrin more than 3 days in a row)  rec to start over the counter antihistamine like zyrtec or claritin as well as nasal steroid such as flonase or nasacort to decrease inflammation       Earache, No Infection (Adult)  Earaches can happen without an infection. This occurs when air and fluid build up behind the eardrum causing  a feeling of fullness and discomfort and reduced hearing. This is called otitis media with effusion (OME) or serous otitis media. It means there is fluid in the middle ear. It is not the same as acute otitis media, which is typically from infection.  OME can happen when you have a cold if congestion blocks the passage that drains the middle ear. This passage is called the eustachian tube. OME may also occur with nasal allergies or after a bacterial middle ear infection.    The pain or discomfort may come and go. You may hear clicking or popping sounds when you chew or swallow. You may feel that your balance is off. Or you may hear ringing in the ear.  It often takes from several weeks up to 3 months for the fluid to clear on its own. Oral pain relievers and ear drops help if there is pain. Decongestants and antihistamines sometimes help. Antibiotics don't help since there is no infection. Your doctor may prescribe a nasal spray to help reduce swelling in the nose and eustachian tube. This can allow the ear to drain.  If your OME doesn't improve after 3 months, surgery may be used to drain the fluid and insert a small tube in the eardrum to allow continued drainage.  Because the middle ear fluid can become infected, it is important to watch for signs of an ear infection which may develop later. These signs include increased ear pain, fever, or drainage from the ear.  Home care  The following guidelines will help you care for yourself at home:  · You may use over-the-counter medicine as directed to control pain, unless another medicine was prescribed. If you have chronic liver or kidney disease or ever had a stomach ulcer or GI bleeding, talk with your doctor before using these medicines. Aspirin should never be used in anyone under 18 years of age who is ill with a fever. It may cause severe liver damage.  · You may use over-the-counter decongestants such as phenylephrine or pseudoephedrine. But they are not always  helpful. Don't use nasal spray decongestants more than 3 days. Longer use can make congestion worse. Prescription nasal sprays from your doctor don't typically have those restrictions.  · Antihistamines may help if you are also having allergy symptoms.  · You may use medicines such as guaifenesin to thin mucus and promote drainage.  Follow-up care  Follow up with your healthcare provider or as advised if you are not feeling better after 3 days.  When to seek medical advice  Call your healthcare provider right away if any of the following occur:  · Your ear pain gets worse or does not start to improve   · Fever of 100.4°F (38°C) or higher, or as directed by your healthcare provider  · Fluid or blood draining from the ear  · Headache or sinus pain  · Stiff neck  · Unusual drowsiness or confusion  Date Last Reviewed: 10/1/2016  © 0331-3139 [employer]. [address] [place] [address], [place] [address]. All rights reserved. This information is not intended as a substitute for professional medical care. Always follow your healthcare professional's instructions.

## 2019-07-15 ENCOUNTER — OFFICE VISIT (OUTPATIENT)
Dept: URGENT CARE | Facility: CLINIC | Age: 29
End: 2019-07-15
Payer: COMMERCIAL

## 2019-07-15 VITALS
RESPIRATION RATE: 20 BRPM | TEMPERATURE: 98 F | HEIGHT: 64 IN | SYSTOLIC BLOOD PRESSURE: 109 MMHG | OXYGEN SATURATION: 98 % | BODY MASS INDEX: 22.2 KG/M2 | HEART RATE: 53 BPM | WEIGHT: 130 LBS | DIASTOLIC BLOOD PRESSURE: 70 MMHG

## 2019-07-15 DIAGNOSIS — M79.89 PAIN AND SWELLING OF TOE OF LEFT FOOT: Primary | ICD-10-CM

## 2019-07-15 DIAGNOSIS — S99.922A INJURY OF TOE ON LEFT FOOT, INITIAL ENCOUNTER: ICD-10-CM

## 2019-07-15 DIAGNOSIS — M79.675 PAIN AND SWELLING OF TOE OF LEFT FOOT: Primary | ICD-10-CM

## 2019-07-15 PROCEDURE — 3008F BODY MASS INDEX DOCD: CPT | Mod: CPTII,S$GLB,, | Performed by: NURSE PRACTITIONER

## 2019-07-15 PROCEDURE — 73630 XR FOOT COMPLETE 3 VIEW LEFT: ICD-10-PCS | Mod: LT,S$GLB,, | Performed by: RADIOLOGY

## 2019-07-15 PROCEDURE — 3008F PR BODY MASS INDEX (BMI) DOCUMENTED: ICD-10-PCS | Mod: CPTII,S$GLB,, | Performed by: NURSE PRACTITIONER

## 2019-07-15 PROCEDURE — 99214 OFFICE O/P EST MOD 30 MIN: CPT | Mod: S$GLB,,, | Performed by: NURSE PRACTITIONER

## 2019-07-15 PROCEDURE — 99214 PR OFFICE/OUTPT VISIT, EST, LEVL IV, 30-39 MIN: ICD-10-PCS | Mod: S$GLB,,, | Performed by: NURSE PRACTITIONER

## 2019-07-15 PROCEDURE — 73630 X-RAY EXAM OF FOOT: CPT | Mod: LT,S$GLB,, | Performed by: RADIOLOGY

## 2019-07-15 RX ORDER — PROPRANOLOL HYDROCHLORIDE 10 MG/1
TABLET ORAL
Refills: 0 | COMMUNITY
Start: 2019-06-28

## 2019-07-15 RX ORDER — TIMOLOL MALEATE 5 MG/ML
SOLUTION/ DROPS OPHTHALMIC
Refills: 0 | COMMUNITY
Start: 2019-07-02 | End: 2021-03-18

## 2019-07-15 RX ORDER — FOLIC ACID 1 MG/1
TABLET ORAL
Refills: 0 | COMMUNITY
Start: 2019-06-28

## 2019-07-15 NOTE — PATIENT INSTRUCTIONS
If your condition worsens or fails to improve we recommend that you receive another evaluation at the emergency room immediately or contact your primary medical clinic to discuss your concerns.   You must understand that you have received an Urgent Care treatment only and that you may be released before all of your medical problems are known or treated. You, the patient, will arrange for follow up care as instructed.   Please return here or go to the Emergency Department for any concerns or worsening of condition.  If you were prescribed antibiotics, please take them to completion.  If you were prescribed a narcotic medication, do not drive or operate heavy equipment or machinery while taking these medications.  Please follow up with your primary care doctor or specialist as needed.    If you  smoke, please stop smoking.    R.I.C.E.    R.I.C.E. stands for Rest, Ice, Compression, and Elevation. Doing these things helps limit pain and swelling after an injury. R.I.C.E. also helps injuries heal faster. Use R.I.C.E. for sprains, strains, and severe bruises or bumps. Follow the tips on this handout and begin R.I.C.E. as soon as possible after an injury.  ? Rest  Pain is your bodys way of telling you to rest an injured area. Whether you have hurt an elbow, hand, foot, or knee, limiting its use will prevent further injury and help you heal.  ? Ice  Applying ice right after an injury helps prevent swelling and reduce pain. Dont place ice directly on your skin.  · Wrap a cold pack or bag of ice in a thin cloth. Place it over the injured area.  · Ice for 10 minutes every 3 hours. Dont ice for more than 20 minutes at a time.  ? Compression  Putting pressure (compression) on an injury helps prevent swelling and provides support.  · Wrap the injured area firmly with an elastic bandage. If your hand or foot tingles, becomes discolored, or feels cold to the touch, the bandage may be too tight. Rewrap it more loosely.  · If  your bandage becomes too loose, rewrap it.  · Do not wear an elastic bandage overnight.  ? Elevation  Keeping an injury elevated helps reduce swelling, pain, and throbbing. Elevation is most effective when the injury is kept elevated higher than the heart.     Call your healthcare provider if you notice any of the following:  · Fingers or toes feel numb, are cold to the touch, or change color  · Skin looks shiny or tight  · Pain, swelling, or bruising worsens and is not improved with elevation   Date Last Reviewed: 9/3/2015  © 0093-1413 [employer]. [address] [place] [id number]. All rights reserved. This information is not intended as a substitute for professional medical care. Always follow your healthcare professional's instructions.        Toe Sprain  A sprain is a stretching or tearing of the ligaments that hold a joint together. There are no broken bones. Sprains generally take from 3-6 weeks to heal. A toe sprain may be treated by taping the injured toe to the next toe. This is called heath taping. This protects the injured toe and holds it in position. Mild sprains may not need any additional support. If the toenail has been hurt badly, it may fall off in 1-2 weeks. A new one will usually start to grow back within a month.     Home care  · Keep your leg elevated when sitting or lying down. This is very important during the first 48 hours to reduce swelling. Stay off the injured foot as much as possible until you can walk on it without pain. If needed, you may use crutches during the first week for this purpose. Crutches can be rented at many pharmacies or surgical/orthopedic supply stores.  · You may be given a cast shoe to wear to prevent movement in your toe. If not, you can use a sandal or any shoe that does not put pressure on the injured toe until the swelling and pain go away. If using a sandal, be careful not to hit your foot against anything, since another injury could make  the sprain worse.  · Apply an ice pack over the injured area for 15 to 20 minutes every 3 to 6 hours. You should do this for the first 24 to 48 hours. You can make an ice pack by filling a plastic bag that seals at the top with ice cubes and then wrapping it with a thin towel. Continue to use ice packs for relief of pain and swelling as needed. As the ice melts, be careful to avoid getting your wrap, splint, or cast wet. As the ice melts, be careful to avoid getting any wrap, splint, tape, or cast wet. After 48 hours, apply heat from a warm shower or bath for 20 minutes several times daily. Alternating ice and heat may also be helpful.  · If buddy tape was applied and it becomes wet or dirty, change it. You may replace it with paper, plastic or cloth tape. Cloth tape and paper tapes must be kept dry. Apply gauze or cotton padding between the toes, especially near webbed area. This will help prevent the skin from getting moist and breaking down. Keep the buddy tape in place for at least 4 weeks, or as advised by your healthcare provider.  · You may use over-the-counter pain medicine to control pain, unless another pain medicine was prescribed. If you have chronic liver or kidney disease or ever had a stomach ulcer or GI bleeding, talk with your healthcare provider before using these medicines.  · You may return to sports after healing, when you can run without pain.  Follow-up care  Follow up with your with your healthcare provider as advised. Sometimes fractures dont show up on the first X-ray. Bruises and sprains can sometimes hurt as much as a fracture. These injuries can take time to heal completely. If your symptoms dont improve or they get worse, talk with your healthcare provider. You may need a repeat X-ray. If X-rays were taken, you will be told of any new findings that may affect your care.  When to seek medical advice  Call your healthcare provider right away if any of these occur:  · Redness, warmth, or  fluid drainage from your toe  · Pain or swelling increases  · Toes become cold, blue, numb, or tingly  Date Last Reviewed: 11/20/2015  © 4921-9175 [employer]. [address] [place] [address], [place] [address]. All rights reserved. This information is not intended as a substitute for professional medical care. Always follow your healthcare professional's instructions.

## 2019-07-15 NOTE — PROGRESS NOTES
"Subjective:       Patient ID: Chelsi Montejo is a 28 y.o. female.    Vitals:  height is 5' 4" (1.626 m) and weight is 59 kg (130 lb). Her temperature is 98.1 °F (36.7 °C). Her blood pressure is 109/70 and her pulse is 53 (abnormal). Her respiration is 20 and oxygen saturation is 98%.     Chief Complaint: Toe Injury    Pt stated last night she was walking past her bed when she stubbed her pinky toe on the frame. She has iced it but hasnt taken anything.    Toe Injury   This is a new problem. The current episode started yesterday. The problem occurs constantly. The problem has been unchanged. Associated symptoms include arthralgias and joint swelling. Pertinent negatives include no abdominal pain, fatigue, vertigo or weakness. The symptoms are aggravated by walking. She has tried ice (elevation) for the symptoms.       Constitution: Negative for fatigue.   HENT: Negative for facial swelling and facial trauma.    Neck: Negative for neck stiffness.   Cardiovascular: Negative for chest trauma.   Eyes: Negative for eye trauma, double vision and blurred vision.   Gastrointestinal: Negative for abdominal trauma, abdominal pain and rectal bleeding.   Genitourinary: Negative for hematuria, missed menses, genital trauma and pelvic pain.   Musculoskeletal: Positive for pain, trauma, joint pain and joint swelling. Negative for abnormal ROM of joint.   Skin: Positive for bruising. Negative for color change, wound, abrasion and laceration.   Neurological: Negative for dizziness, history of vertigo, light-headedness, coordination disturbances, altered mental status and loss of consciousness.   Hematologic/Lymphatic: Negative for history of bleeding disorder.   Psychiatric/Behavioral: Negative for altered mental status.       Objective:      Physical Exam   Constitutional: She is oriented to person, place, and time. Vital signs are normal. She appears well-developed and well-nourished. She is cooperative.  Non-toxic appearance. She " does not appear ill. No distress.   HENT:   Head: Normocephalic and atraumatic. Head is without abrasion, without contusion and without laceration.   Right Ear: Hearing normal.   Left Ear: Hearing normal.   Nose: No nasal deformity. No epistaxis.   Mouth/Throat: Mucous membranes are normal.   Eyes: Conjunctivae and lids are normal. Right eye exhibits no discharge. Left eye exhibits no discharge. No scleral icterus.   Sclera clear bilat   Neck: Trachea normal, full passive range of motion without pain and phonation normal. Neck supple. No spinous process tenderness and no muscular tenderness present. No neck rigidity. No tracheal deviation present.   Cardiovascular: Normal rate, intact distal pulses and normal pulses.   Pulses:       Dorsalis pedis pulses are 2+ on the left side.        Posterior tibial pulses are 2+ on the left side.   Pulmonary/Chest: Effort normal. No stridor. No respiratory distress.   Abdominal: Normal appearance. She exhibits no pulsatile midline mass.   Musculoskeletal: She exhibits tenderness. She exhibits no edema or deformity.        Left foot: There is tenderness, bony tenderness and swelling. There is normal range of motion and no deformity.        Feet:    Feet:   Left Foot:   Skin Integrity: Negative for ulcer, blister, skin breakdown, erythema, warmth, callus or dry skin.   Neurological: She is alert and oriented to person, place, and time. She has normal strength. No cranial nerve deficit or sensory deficit. She exhibits normal muscle tone. She displays no seizure activity. Coordination normal. GCS eye subscore is 4. GCS verbal subscore is 5. GCS motor subscore is 6.   Skin: Skin is warm, dry and intact. Capillary refill takes less than 2 seconds. Bruising (left pinky toe) noted. No abrasion, no burn, no ecchymosis and no laceration noted. She is not diaphoretic. No pallor.   Psychiatric: She has a normal mood and affect. Her speech is normal and behavior is normal. Judgment and  thought content normal. Cognition and memory are normal.   Nursing note and vitals reviewed.    X-ray Foot Complete 3 View Left    Result Date: 7/15/2019  EXAMINATION: XR FOOT COMPLETE 3 VIEW LEFT CLINICAL HISTORY: .  Pain in left toe(s) TECHNIQUE: AP, lateral and oblique views of the left foot were performed. COMPARISON: None FINDINGS: No fracture or dislocation.  No bone destruction identified.  Mild hallux valgus.     See above Electronically signed by: José Luis Ware MD Date:    07/15/2019 Time:    09:31  Assessment:       1. Pain and swelling of toe of left foot    2. Injury of toe on left foot, initial encounter        Plan:         Pain and swelling of toe of left foot  -     X-Ray Foot Complete 3 view Left; Future; Expected date: 07/15/2019    Injury of toe on left foot, initial encounter          Patient Instructions       If your condition worsens or fails to improve we recommend that you receive another evaluation at the emergency room immediately or contact your primary medical clinic to discuss your concerns.   You must understand that you have received an Urgent Care treatment only and that you may be released before all of your medical problems are known or treated. You, the patient, will arrange for follow up care as instructed.   Please return here or go to the Emergency Department for any concerns or worsening of condition.  If you were prescribed antibiotics, please take them to completion.  If you were prescribed a narcotic medication, do not drive or operate heavy equipment or machinery while taking these medications.  Please follow up with your primary care doctor or specialist as needed.    If you  smoke, please stop smoking.    R.I.C.E.    R.I.C.E. stands for Rest, Ice, Compression, and Elevation. Doing these things helps limit pain and swelling after an injury. R.I.C.E. also helps injuries heal faster. Use R.I.C.E. for sprains, strains, and severe bruises or bumps. Follow the tips on this  handout and begin R.I.C.E. as soon as possible after an injury.  ? Rest  Pain is your bodys way of telling you to rest an injured area. Whether you have hurt an elbow, hand, foot, or knee, limiting its use will prevent further injury and help you heal.  ? Ice  Applying ice right after an injury helps prevent swelling and reduce pain. Dont place ice directly on your skin.  · Wrap a cold pack or bag of ice in a thin cloth. Place it over the injured area.  · Ice for 10 minutes every 3 hours. Dont ice for more than 20 minutes at a time.  ? Compression  Putting pressure (compression) on an injury helps prevent swelling and provides support.  · Wrap the injured area firmly with an elastic bandage. If your hand or foot tingles, becomes discolored, or feels cold to the touch, the bandage may be too tight. Rewrap it more loosely.  · If your bandage becomes too loose, rewrap it.  · Do not wear an elastic bandage overnight.  ? Elevation  Keeping an injury elevated helps reduce swelling, pain, and throbbing. Elevation is most effective when the injury is kept elevated higher than the heart.     Call your healthcare provider if you notice any of the following:  · Fingers or toes feel numb, are cold to the touch, or change color  · Skin looks shiny or tight  · Pain, swelling, or bruising worsens and is not improved with elevation   Date Last Reviewed: 9/3/2015  © 4818-2615 [employer]. [address] [place] [address]. All rights reserved. This information is not intended as a substitute for professional medical care. Always follow your healthcare professional's instructions.        Toe Sprain  A sprain is a stretching or tearing of the ligaments that hold a joint together. There are no broken bones. Sprains generally take from 3-6 weeks to heal. A toe sprain may be treated by taping the injured toe to the next toe. This is called heath taping. This protects the injured toe and holds it in position. Mild  sprains may not need any additional support. If the toenail has been hurt badly, it may fall off in 1-2 weeks. A new one will usually start to grow back within a month.     Home care  · Keep your leg elevated when sitting or lying down. This is very important during the first 48 hours to reduce swelling. Stay off the injured foot as much as possible until you can walk on it without pain. If needed, you may use crutches during the first week for this purpose. Crutches can be rented at many pharmacies or surgical/orthopedic supply stores.  · You may be given a cast shoe to wear to prevent movement in your toe. If not, you can use a sandal or any shoe that does not put pressure on the injured toe until the swelling and pain go away. If using a sandal, be careful not to hit your foot against anything, since another injury could make the sprain worse.  · Apply an ice pack over the injured area for 15 to 20 minutes every 3 to 6 hours. You should do this for the first 24 to 48 hours. You can make an ice pack by filling a plastic bag that seals at the top with ice cubes and then wrapping it with a thin towel. Continue to use ice packs for relief of pain and swelling as needed. As the ice melts, be careful to avoid getting your wrap, splint, or cast wet. As the ice melts, be careful to avoid getting any wrap, splint, tape, or cast wet. After 48 hours, apply heat from a warm shower or bath for 20 minutes several times daily. Alternating ice and heat may also be helpful.  · If buddy tape was applied and it becomes wet or dirty, change it. You may replace it with paper, plastic or cloth tape. Cloth tape and paper tapes must be kept dry. Apply gauze or cotton padding between the toes, especially near webbed area. This will help prevent the skin from getting moist and breaking down. Keep the buddy tape in place for at least 4 weeks, or as advised by your healthcare provider.  · You may use over-the-counter pain medicine to  control pain, unless another pain medicine was prescribed. If you have chronic liver or kidney disease or ever had a stomach ulcer or GI bleeding, talk with your healthcare provider before using these medicines.  · You may return to sports after healing, when you can run without pain.  Follow-up care  Follow up with your with your healthcare provider as advised. Sometimes fractures dont show up on the first X-ray. Bruises and sprains can sometimes hurt as much as a fracture. These injuries can take time to heal completely. If your symptoms dont improve or they get worse, talk with your healthcare provider. You may need a repeat X-ray. If X-rays were taken, you will be told of any new findings that may affect your care.  When to seek medical advice  Call your healthcare provider right away if any of these occur:  · Redness, warmth, or fluid drainage from your toe  · Pain or swelling increases  · Toes become cold, blue, numb, or tingly  Date Last Reviewed: 11/20/2015  © 5326-5366 [employer]. [address] [place] [address], [place] [address]. All rights reserved. This information is not intended as a substitute for professional medical care. Always follow your healthcare professional's instructions.

## 2019-10-13 ENCOUNTER — CLINICAL SUPPORT (OUTPATIENT)
Dept: URGENT CARE | Facility: CLINIC | Age: 29
End: 2019-10-13
Payer: COMMERCIAL

## 2019-10-13 DIAGNOSIS — Z23 NEEDS FLU SHOT: Primary | ICD-10-CM

## 2019-10-13 PROCEDURE — 90686 IIV4 VACC NO PRSV 0.5 ML IM: CPT | Mod: S$GLB,,, | Performed by: PHYSICIAN ASSISTANT

## 2019-10-13 PROCEDURE — 90471 FLU VACCINE (QUAD) GREATER THAN OR EQUAL TO 3YO PRESERVATIVE FREE IM: ICD-10-PCS | Mod: S$GLB,,, | Performed by: PHYSICIAN ASSISTANT

## 2019-10-13 PROCEDURE — 90471 IMMUNIZATION ADMIN: CPT | Mod: S$GLB,,, | Performed by: PHYSICIAN ASSISTANT

## 2019-10-13 PROCEDURE — 90686 FLU VACCINE (QUAD) GREATER THAN OR EQUAL TO 3YO PRESERVATIVE FREE IM: ICD-10-PCS | Mod: S$GLB,,, | Performed by: PHYSICIAN ASSISTANT

## 2020-05-21 ENCOUNTER — TELEPHONE (OUTPATIENT)
Dept: URGENT CARE | Facility: CLINIC | Age: 30
End: 2020-05-21

## 2020-05-21 ENCOUNTER — OFFICE VISIT (OUTPATIENT)
Dept: URGENT CARE | Facility: CLINIC | Age: 30
End: 2020-05-21
Payer: COMMERCIAL

## 2020-05-21 VITALS
OXYGEN SATURATION: 97 % | BODY MASS INDEX: 23.92 KG/M2 | TEMPERATURE: 99 F | SYSTOLIC BLOOD PRESSURE: 132 MMHG | WEIGHT: 135 LBS | DIASTOLIC BLOOD PRESSURE: 8 MMHG | HEART RATE: 62 BPM | HEIGHT: 63 IN | RESPIRATION RATE: 18 BRPM

## 2020-05-21 DIAGNOSIS — Z11.59 SCREENING FOR VIRAL DISEASE: Primary | ICD-10-CM

## 2020-05-21 LAB — SARS-COV-2 IGG SERPLBLD QL IA.RAPID: NEGATIVE

## 2020-05-21 PROCEDURE — 99000 PR SPECIMEN HANDLING,DR OFF->LAB: ICD-10-PCS | Mod: S$GLB,,, | Performed by: EMERGENCY MEDICINE

## 2020-05-21 PROCEDURE — 99211 PR OFFICE/OUTPT VISIT, EST, LEVL I: ICD-10-PCS | Mod: S$GLB,,, | Performed by: NURSE PRACTITIONER

## 2020-05-21 PROCEDURE — 99000 SPECIMEN HANDLING OFFICE-LAB: CPT | Mod: S$GLB,,, | Performed by: EMERGENCY MEDICINE

## 2020-05-21 PROCEDURE — 86769 SARS-COV-2 COVID-19 ANTIBODY: CPT

## 2020-05-21 PROCEDURE — 99211 OFF/OP EST MAY X REQ PHY/QHP: CPT | Mod: S$GLB,,, | Performed by: NURSE PRACTITIONER

## 2020-05-21 PROCEDURE — 36415 PR COLLECTION VENOUS BLOOD,VENIPUNCTURE: ICD-10-PCS | Mod: S$GLB,,, | Performed by: EMERGENCY MEDICINE

## 2020-05-21 PROCEDURE — 36415 COLL VENOUS BLD VENIPUNCTURE: CPT | Mod: S$GLB,,, | Performed by: EMERGENCY MEDICINE

## 2020-05-21 RX ORDER — LEVONORGESTREL AND ETHINYL ESTRADIOL 0.1-0.02MG
1 KIT ORAL
COMMUNITY
Start: 2019-08-08 | End: 2020-08-07

## 2020-05-21 RX ORDER — PROPRANOLOL HYDROCHLORIDE 10 MG/1
TABLET ORAL
COMMUNITY
Start: 2019-06-03 | End: 2020-05-21

## 2020-05-21 NOTE — PROGRESS NOTES
"Subjective:       Patient ID: Chelsi Montejo is a 29 y.o. female.    Vitals:  height is 5' 3" (1.6 m) and weight is 61.2 kg (135 lb). Her temperature is 98.6 °F (37 °C). Her blood pressure is 132/8 (abnormal) and her pulse is 62. Her respiration is 18 and oxygen saturation is 97%.     Chief Complaint: COVID-19 Concerns    Patient is requesting antibody testing.  She states that she interacted with a coworker who tested positive a while back.  She is asymptomatic.       Constitution: Negative for chills, sweating, fatigue and fever.   HENT: Negative for ear pain, congestion, sinus pain, sinus pressure, sore throat and voice change.    Neck: Negative for painful lymph nodes.   Cardiovascular: Negative for chest pain and leg swelling.   Eyes: Negative for eye redness, double vision and blurred vision.   Respiratory: Negative for chest tightness, cough, sputum production, bloody sputum, COPD, shortness of breath, stridor, wheezing and asthma.    Gastrointestinal: Negative for nausea, vomiting and diarrhea.   Genitourinary: Negative for dysuria, frequency, urgency and history of kidney stones.   Musculoskeletal: Negative for joint pain, joint swelling, muscle cramps and muscle ache.   Skin: Negative for color change, pale, rash and bruising.   Allergic/Immunologic: Negative for seasonal allergies and asthma.   Neurological: Negative for dizziness, history of vertigo, light-headedness, passing out and headaches.   Hematologic/Lymphatic: Negative for swollen lymph nodes.   Psychiatric/Behavioral: Negative for nervous/anxious, sleep disturbance and depression. The patient is not nervous/anxious.        Objective:      Physical Exam   Constitutional: She is oriented to person, place, and time. She appears well-developed and well-nourished. She is cooperative.  Non-toxic appearance. She does not appear ill. No distress.   HENT:   Head: Normocephalic and atraumatic.   Right Ear: Hearing, tympanic membrane, external ear and ear " canal normal.   Left Ear: Hearing, tympanic membrane, external ear and ear canal normal.   Nose: Nose normal. No mucosal edema, rhinorrhea or nasal deformity. No epistaxis. Right sinus exhibits no maxillary sinus tenderness and no frontal sinus tenderness. Left sinus exhibits no maxillary sinus tenderness and no frontal sinus tenderness.   Mouth/Throat: Uvula is midline, oropharynx is clear and moist and mucous membranes are normal. No trismus in the jaw. Normal dentition. No uvula swelling. No posterior oropharyngeal erythema.   Eyes: Conjunctivae and lids are normal. Right eye exhibits no discharge. Left eye exhibits no discharge. No scleral icterus.   Neck: Trachea normal, normal range of motion, full passive range of motion without pain and phonation normal. Neck supple.   Cardiovascular: Normal rate, regular rhythm, normal heart sounds, intact distal pulses and normal pulses.   Pulmonary/Chest: Effort normal and breath sounds normal. No respiratory distress.   Abdominal: Soft. Normal appearance and bowel sounds are normal. She exhibits no distension, no pulsatile midline mass and no mass. There is no tenderness.   Musculoskeletal: Normal range of motion. She exhibits no edema or deformity.   Neurological: She is alert and oriented to person, place, and time. She exhibits normal muscle tone. Coordination normal.   Skin: Skin is warm, dry, intact, not diaphoretic and not pale.   Psychiatric: She has a normal mood and affect. Her speech is normal and behavior is normal. Judgment and thought content normal. Cognition and memory are normal.   Nursing note and vitals reviewed.        Assessment:       1. Screening for viral disease        Plan:         Screening for viral disease  -     COVID-19 (SARS CoV-2) IgG Antibody      Patient Instructions     Patient Instructions   Counseled patient , explained what the test results mean, and answered questions on IGg antibody testing.     Guidelines for General Prevention of  COVID-19    o Take steps to protect yourself from COVID-19. Perform hand hygiene frequently. Wash your hands often with soap and water for at least 20 seconds of use and alcohol-based hand , covering all surfaces of your hands and rubbing them together until they feel dry.  o Avoid touching your eyes, nose, and mouth with unwashed hands.  o Avoid close contact with people and stay home if youre sick, except to get medical care.   o Cover coughs and sneezes with a tissue, or use the inside of your elbow. Immediately wash your hands or use hand .     For more information, see CDC link below:    https://www.cdc.gov/coronavirus/2019-ncov/hcp/guidance-prevent-spread.html#precautions

## 2020-05-22 ENCOUNTER — TELEPHONE (OUTPATIENT)
Dept: URGENT CARE | Facility: CLINIC | Age: 30
End: 2020-05-22

## 2021-01-11 ENCOUNTER — CLINICAL SUPPORT (OUTPATIENT)
Dept: AUDIOLOGY | Facility: CLINIC | Age: 31
End: 2021-01-11
Payer: COMMERCIAL

## 2021-01-11 ENCOUNTER — OFFICE VISIT (OUTPATIENT)
Dept: OTOLARYNGOLOGY | Facility: CLINIC | Age: 31
End: 2021-01-11
Payer: COMMERCIAL

## 2021-01-11 VITALS — WEIGHT: 146.63 LBS | BODY MASS INDEX: 25.97 KG/M2

## 2021-01-11 DIAGNOSIS — H92.02 LEFT EAR PAIN: ICD-10-CM

## 2021-01-11 DIAGNOSIS — H93.232 HYPERACUSIS OF LEFT EAR: Primary | ICD-10-CM

## 2021-01-11 DIAGNOSIS — H92.02 EAR PAIN, LEFT: Primary | ICD-10-CM

## 2021-01-11 DIAGNOSIS — H93.13 TINNITUS OF BOTH EARS: ICD-10-CM

## 2021-01-11 PROCEDURE — 3008F BODY MASS INDEX DOCD: CPT | Mod: CPTII,S$GLB,, | Performed by: OTOLARYNGOLOGY

## 2021-01-11 PROCEDURE — 99204 PR OFFICE/OUTPT VISIT, NEW, LEVL IV, 45-59 MIN: ICD-10-PCS | Mod: S$GLB,,, | Performed by: OTOLARYNGOLOGY

## 2021-01-11 PROCEDURE — 99999 PR PBB SHADOW E&M-EST. PATIENT-LVL III: CPT | Mod: PBBFAC,,, | Performed by: OTOLARYNGOLOGY

## 2021-01-11 PROCEDURE — 1126F PR PAIN SEVERITY QUANTIFIED, NO PAIN PRESENT: ICD-10-PCS | Mod: S$GLB,,, | Performed by: OTOLARYNGOLOGY

## 2021-01-11 PROCEDURE — 99204 OFFICE O/P NEW MOD 45 MIN: CPT | Mod: S$GLB,,, | Performed by: OTOLARYNGOLOGY

## 2021-01-11 PROCEDURE — 1126F AMNT PAIN NOTED NONE PRSNT: CPT | Mod: S$GLB,,, | Performed by: OTOLARYNGOLOGY

## 2021-01-11 PROCEDURE — 92556 PR SPEECH AUDIOMETRY, COMPLETE: ICD-10-PCS | Mod: S$GLB,,, | Performed by: AUDIOLOGIST

## 2021-01-11 PROCEDURE — 92567 TYMPANOMETRY: CPT | Mod: S$GLB,,, | Performed by: AUDIOLOGIST

## 2021-01-11 PROCEDURE — 92567 PR TYMPA2METRY: ICD-10-PCS | Mod: S$GLB,,, | Performed by: AUDIOLOGIST

## 2021-01-11 PROCEDURE — 92552 PR PURE TONE AUDIOMETRY, AIR: ICD-10-PCS | Mod: S$GLB,,, | Performed by: AUDIOLOGIST

## 2021-01-11 PROCEDURE — 3008F PR BODY MASS INDEX (BMI) DOCUMENTED: ICD-10-PCS | Mod: CPTII,S$GLB,, | Performed by: OTOLARYNGOLOGY

## 2021-01-11 PROCEDURE — 92552 PURE TONE AUDIOMETRY AIR: CPT | Mod: S$GLB,,, | Performed by: AUDIOLOGIST

## 2021-01-11 PROCEDURE — 92556 SPEECH AUDIOMETRY COMPLETE: CPT | Mod: S$GLB,,, | Performed by: AUDIOLOGIST

## 2021-01-11 PROCEDURE — 99999 PR PBB SHADOW E&M-EST. PATIENT-LVL III: ICD-10-PCS | Mod: PBBFAC,,, | Performed by: OTOLARYNGOLOGY

## 2021-01-11 RX ORDER — ALPRAZOLAM 0.5 MG/1
0.5 TABLET ORAL 3 TIMES DAILY
Qty: 90 TABLET | Refills: 0 | Status: SHIPPED | OUTPATIENT
Start: 2021-01-11 | End: 2021-02-10

## 2021-02-05 ENCOUNTER — CLINICAL SUPPORT (OUTPATIENT)
Dept: URGENT CARE | Facility: CLINIC | Age: 31
End: 2021-02-05
Payer: COMMERCIAL

## 2021-02-05 VITALS — OXYGEN SATURATION: 98 % | HEART RATE: 78 BPM | TEMPERATURE: 99 F

## 2021-02-05 DIAGNOSIS — Z11.59 SCREENING FOR VIRAL DISEASE: Primary | ICD-10-CM

## 2021-02-05 LAB
CTP QC/QA: YES
SARS-COV-2 RDRP RESP QL NAA+PROBE: NEGATIVE

## 2021-02-05 PROCEDURE — U0002: ICD-10-PCS | Mod: QW,S$GLB,, | Performed by: NURSE PRACTITIONER

## 2021-02-05 PROCEDURE — U0002 COVID-19 LAB TEST NON-CDC: HCPCS | Mod: QW,S$GLB,, | Performed by: NURSE PRACTITIONER

## 2021-03-12 ENCOUNTER — IMMUNIZATION (OUTPATIENT)
Dept: PRIMARY CARE CLINIC | Facility: CLINIC | Age: 31
End: 2021-03-12
Payer: COMMERCIAL

## 2021-03-12 DIAGNOSIS — Z23 NEED FOR VACCINATION: Primary | ICD-10-CM

## 2021-03-12 PROCEDURE — 91300 PR SARS-COV- 2 COVID-19 VACCINE, NO PRSV, 30MCG/0.3ML, IM: ICD-10-PCS | Mod: S$GLB,,, | Performed by: INTERNAL MEDICINE

## 2021-03-12 PROCEDURE — 0001A PR IMMUNIZ ADMIN, SARS-COV-2 COVID-19 VACC, 30MCG/0.3ML, 1ST DOSE: ICD-10-PCS | Mod: CV19,S$GLB,, | Performed by: INTERNAL MEDICINE

## 2021-03-12 PROCEDURE — 0001A PR IMMUNIZ ADMIN, SARS-COV-2 COVID-19 VACC, 30MCG/0.3ML, 1ST DOSE: CPT | Mod: CV19,S$GLB,, | Performed by: INTERNAL MEDICINE

## 2021-03-12 PROCEDURE — 91300 PR SARS-COV- 2 COVID-19 VACCINE, NO PRSV, 30MCG/0.3ML, IM: CPT | Mod: S$GLB,,, | Performed by: INTERNAL MEDICINE

## 2021-03-12 RX ADMIN — Medication 0.3 ML: at 02:03

## 2021-03-18 ENCOUNTER — OFFICE VISIT (OUTPATIENT)
Dept: URGENT CARE | Facility: CLINIC | Age: 31
End: 2021-03-18
Payer: COMMERCIAL

## 2021-03-18 VITALS
RESPIRATION RATE: 16 BRPM | SYSTOLIC BLOOD PRESSURE: 115 MMHG | HEIGHT: 63 IN | BODY MASS INDEX: 23.92 KG/M2 | WEIGHT: 135 LBS | HEART RATE: 61 BPM | OXYGEN SATURATION: 100 % | DIASTOLIC BLOOD PRESSURE: 78 MMHG | TEMPERATURE: 97 F

## 2021-03-18 DIAGNOSIS — I44.0 FIRST DEGREE AV BLOCK: ICD-10-CM

## 2021-03-18 DIAGNOSIS — R06.02 SOB (SHORTNESS OF BREATH): ICD-10-CM

## 2021-03-18 DIAGNOSIS — Z11.59 SCREENING FOR VIRAL DISEASE: Primary | ICD-10-CM

## 2021-03-18 LAB
CTP QC/QA: YES
SARS-COV-2 RDRP RESP QL NAA+PROBE: NEGATIVE

## 2021-03-18 PROCEDURE — 99214 PR OFFICE/OUTPT VISIT, EST, LEVL IV, 30-39 MIN: ICD-10-PCS | Mod: S$GLB,,, | Performed by: FAMILY MEDICINE

## 2021-03-18 PROCEDURE — 93010 EKG 12-LEAD: ICD-10-PCS | Mod: S$GLB,,, | Performed by: INTERNAL MEDICINE

## 2021-03-18 PROCEDURE — 3008F BODY MASS INDEX DOCD: CPT | Mod: CPTII,S$GLB,, | Performed by: FAMILY MEDICINE

## 2021-03-18 PROCEDURE — 93010 ELECTROCARDIOGRAM REPORT: CPT | Mod: S$GLB,,, | Performed by: INTERNAL MEDICINE

## 2021-03-18 PROCEDURE — U0002 COVID-19 LAB TEST NON-CDC: HCPCS | Mod: QW,S$GLB,, | Performed by: FAMILY MEDICINE

## 2021-03-18 PROCEDURE — U0002: ICD-10-PCS | Mod: QW,S$GLB,, | Performed by: FAMILY MEDICINE

## 2021-03-18 PROCEDURE — 93005 EKG 12-LEAD: ICD-10-PCS | Mod: S$GLB,,, | Performed by: FAMILY MEDICINE

## 2021-03-18 PROCEDURE — 3008F PR BODY MASS INDEX (BMI) DOCUMENTED: ICD-10-PCS | Mod: CPTII,S$GLB,, | Performed by: FAMILY MEDICINE

## 2021-03-18 PROCEDURE — 71046 X-RAY EXAM CHEST 2 VIEWS: CPT | Mod: S$GLB,,, | Performed by: RADIOLOGY

## 2021-03-18 PROCEDURE — 99214 OFFICE O/P EST MOD 30 MIN: CPT | Mod: S$GLB,,, | Performed by: FAMILY MEDICINE

## 2021-03-18 PROCEDURE — 71046 XR CHEST PA AND LATERAL: ICD-10-PCS | Mod: S$GLB,,, | Performed by: RADIOLOGY

## 2021-03-18 PROCEDURE — 93005 ELECTROCARDIOGRAM TRACING: CPT | Mod: S$GLB,,, | Performed by: FAMILY MEDICINE

## 2021-04-02 ENCOUNTER — IMMUNIZATION (OUTPATIENT)
Dept: PRIMARY CARE CLINIC | Facility: CLINIC | Age: 31
End: 2021-04-02
Payer: COMMERCIAL

## 2021-04-02 DIAGNOSIS — Z23 NEED FOR VACCINATION: Primary | ICD-10-CM

## 2021-04-02 PROCEDURE — 0002A PR IMMUNIZ ADMIN, SARS-COV-2 COVID-19 VACC, 30MCG/0.3ML, 2ND DOSE: CPT | Mod: CV19,S$GLB,, | Performed by: INTERNAL MEDICINE

## 2021-04-02 PROCEDURE — 0002A PR IMMUNIZ ADMIN, SARS-COV-2 COVID-19 VACC, 30MCG/0.3ML, 2ND DOSE: ICD-10-PCS | Mod: CV19,S$GLB,, | Performed by: INTERNAL MEDICINE

## 2021-04-02 PROCEDURE — 91300 PR SARS-COV- 2 COVID-19 VACCINE, NO PRSV, 30MCG/0.3ML, IM: ICD-10-PCS | Mod: S$GLB,,, | Performed by: INTERNAL MEDICINE

## 2021-04-02 PROCEDURE — 91300 PR SARS-COV- 2 COVID-19 VACCINE, NO PRSV, 30MCG/0.3ML, IM: CPT | Mod: S$GLB,,, | Performed by: INTERNAL MEDICINE

## 2021-04-02 RX ADMIN — Medication 0.3 ML: at 03:04

## 2021-05-26 ENCOUNTER — OFFICE VISIT (OUTPATIENT)
Dept: URGENT CARE | Facility: CLINIC | Age: 31
End: 2021-05-26
Payer: COMMERCIAL

## 2021-05-26 VITALS
BODY MASS INDEX: 23.92 KG/M2 | HEIGHT: 63 IN | WEIGHT: 135 LBS | RESPIRATION RATE: 18 BRPM | TEMPERATURE: 97 F | OXYGEN SATURATION: 97 % | DIASTOLIC BLOOD PRESSURE: 72 MMHG | SYSTOLIC BLOOD PRESSURE: 104 MMHG | HEART RATE: 64 BPM

## 2021-05-26 DIAGNOSIS — Z11.59 SCREENING FOR VIRAL DISEASE: Primary | ICD-10-CM

## 2021-05-26 DIAGNOSIS — J06.9 UPPER RESPIRATORY TRACT INFECTION, UNSPECIFIED TYPE: ICD-10-CM

## 2021-05-26 LAB
CTP QC/QA: YES
SARS-COV-2 RDRP RESP QL NAA+PROBE: NEGATIVE

## 2021-05-26 PROCEDURE — 99214 PR OFFICE/OUTPT VISIT, EST, LEVL IV, 30-39 MIN: ICD-10-PCS | Mod: S$GLB,,, | Performed by: NURSE PRACTITIONER

## 2021-05-26 PROCEDURE — 3008F BODY MASS INDEX DOCD: CPT | Mod: CPTII,S$GLB,, | Performed by: NURSE PRACTITIONER

## 2021-05-26 PROCEDURE — 99214 OFFICE O/P EST MOD 30 MIN: CPT | Mod: S$GLB,,, | Performed by: NURSE PRACTITIONER

## 2021-05-26 PROCEDURE — U0002 COVID-19 LAB TEST NON-CDC: HCPCS | Mod: QW,S$GLB,, | Performed by: NURSE PRACTITIONER

## 2021-05-26 PROCEDURE — 3008F PR BODY MASS INDEX (BMI) DOCUMENTED: ICD-10-PCS | Mod: CPTII,S$GLB,, | Performed by: NURSE PRACTITIONER

## 2021-05-26 PROCEDURE — U0002: ICD-10-PCS | Mod: QW,S$GLB,, | Performed by: NURSE PRACTITIONER

## 2021-05-26 RX ORDER — AMOXICILLIN AND CLAVULANATE POTASSIUM 875; 125 MG/1; MG/1
1 TABLET, FILM COATED ORAL 2 TIMES DAILY
Qty: 20 TABLET | Refills: 0 | Status: SHIPPED | OUTPATIENT
Start: 2021-05-26 | End: 2021-06-05

## 2021-06-09 ENCOUNTER — OFFICE VISIT (OUTPATIENT)
Dept: URGENT CARE | Facility: CLINIC | Age: 31
End: 2021-06-09

## 2021-06-09 VITALS
HEIGHT: 64 IN | HEART RATE: 60 BPM | SYSTOLIC BLOOD PRESSURE: 106 MMHG | OXYGEN SATURATION: 97 % | RESPIRATION RATE: 16 BRPM | WEIGHT: 133.94 LBS | BODY MASS INDEX: 22.86 KG/M2 | TEMPERATURE: 98 F | DIASTOLIC BLOOD PRESSURE: 69 MMHG

## 2021-06-09 DIAGNOSIS — Z02.1 PRE-EMPLOYMENT EXAMINATION: Primary | ICD-10-CM

## 2021-06-09 PROCEDURE — 99499 PR PHYSICAL - SPORTS/SCHOOL: ICD-10-PCS | Mod: CSM,S$GLB,, | Performed by: NURSE PRACTITIONER

## 2021-06-09 PROCEDURE — 99499 UNLISTED E&M SERVICE: CPT | Mod: S$GLB,,, | Performed by: NURSE PRACTITIONER

## 2021-06-09 PROCEDURE — 99499 UNLISTED E&M SERVICE: CPT | Mod: CSM,S$GLB,, | Performed by: NURSE PRACTITIONER

## 2023-10-05 DIAGNOSIS — R52 PAIN: Primary | ICD-10-CM

## 2023-10-09 ENCOUNTER — TELEPHONE (OUTPATIENT)
Dept: ORTHOPEDICS | Facility: CLINIC | Age: 33
End: 2023-10-09
Payer: COMMERCIAL

## 2024-01-15 ENCOUNTER — CLINICAL SUPPORT (OUTPATIENT)
Dept: URGENT CARE | Facility: CLINIC | Age: 34
End: 2024-01-15
Payer: COMMERCIAL

## 2024-01-15 DIAGNOSIS — Z23 FLU VACCINE NEED: Primary | ICD-10-CM
